# Patient Record
Sex: MALE | Race: WHITE | NOT HISPANIC OR LATINO | Employment: UNEMPLOYED | URBAN - METROPOLITAN AREA
[De-identification: names, ages, dates, MRNs, and addresses within clinical notes are randomized per-mention and may not be internally consistent; named-entity substitution may affect disease eponyms.]

---

## 2020-12-17 ENCOUNTER — APPOINTMENT (EMERGENCY)
Dept: RADIOLOGY | Facility: HOSPITAL | Age: 25
End: 2020-12-17

## 2020-12-17 ENCOUNTER — HOSPITAL ENCOUNTER (EMERGENCY)
Facility: HOSPITAL | Age: 25
Discharge: HOME | End: 2020-12-17
Attending: EMERGENCY MEDICINE | Admitting: EMERGENCY MEDICINE

## 2020-12-17 VITALS
TEMPERATURE: 98 F | OXYGEN SATURATION: 97 % | DIASTOLIC BLOOD PRESSURE: 71 MMHG | RESPIRATION RATE: 18 BRPM | SYSTOLIC BLOOD PRESSURE: 135 MMHG | HEART RATE: 83 BPM

## 2020-12-17 DIAGNOSIS — S90.31XA CONTUSION OF RIGHT FOOT, INITIAL ENCOUNTER: Primary | ICD-10-CM

## 2020-12-17 PROCEDURE — 99283 EMERGENCY DEPT VISIT LOW MDM: CPT

## 2020-12-17 PROCEDURE — 73630 X-RAY EXAM OF FOOT: CPT

## 2020-12-17 PROCEDURE — 99282 EMERGENCY DEPT VISIT SF MDM: CPT | Performed by: PHYSICIAN ASSISTANT

## 2021-01-01 NOTE — ED PROVIDER NOTES
History  Chief Complaint   Patient presents with    Foot Injury     Pt was helping car in snow and had right foot run over by tire  Pt has swelling noted to foot and tenderness  Pt has movement of toes  31year old male presents today with injury to his right foot  Pt reports helping to push a car out of the snow and in doing so, his right foot was run over by the car tire  Pt reports pain, swelling and difficulty bearing weight  Decreased ROM due to pain  Denies numbness or tingling  No medications taken prior to arrival            None       History reviewed  No pertinent past medical history  History reviewed  No pertinent surgical history  History reviewed  No pertinent family history  I have reviewed and agree with the history as documented  E-Cigarette/Vaping     E-Cigarette/Vaping Substances     Social History     Tobacco Use    Smoking status: Never Smoker    Smokeless tobacco: Never Used   Substance Use Topics    Alcohol use: Never     Frequency: Never    Drug use: Never       Review of Systems   Musculoskeletal: Positive for arthralgias and joint swelling  All other systems reviewed and are negative  Physical Exam  Physical Exam  Constitutional:       General: He is not in acute distress  Appearance: He is not toxic-appearing  HENT:      Head: Normocephalic and atraumatic  Cardiovascular:      Rate and Rhythm: Normal rate  Pulmonary:      Effort: No respiratory distress  Musculoskeletal:      Comments: Swelling to lateral aspect of right foot with pain ecchymosis  Decreased range of motion due to pain  Sensation intact, brisk capillary refill  Pulses intact  Skin:     General: Skin is warm and dry  Capillary Refill: Capillary refill takes less than 2 seconds  Neurological:      General: No focal deficit present  Mental Status: He is alert  Sensory: No sensory deficit     Psychiatric:         Mood and Affect: Mood normal          Behavior: Behavior normal          Vital Signs  ED Triage Vitals [12/17/20 1844]   Temperature Pulse Respirations Blood Pressure SpO2   98 °F (36 7 °C) (!) 106 18 168/81 98 %      Temp Source Heart Rate Source Patient Position - Orthostatic VS BP Location FiO2 (%)   Temporal Monitor Sitting Right arm --      Pain Score       6           Vitals:    12/17/20 1844 12/17/20 2000   BP: 168/81 135/71   Pulse: (!) 106 83   Patient Position - Orthostatic VS: Sitting Lying         Visual Acuity      ED Medications  Medications - No data to display    Diagnostic Studies  Results Reviewed     None                 XR foot 3+ views RIGHT   Final Result by Angélica Mosher MD (12/17 1931)      No acute osseous abnormality  Workstation performed: [id number]                    Procedures  Procedures         ED Course                             SBIRT 22yo+      Most Recent Value   SBIRT (24 yo +)   In order to provide better care to our patients, we are screening all of our patients for alcohol and drug use  Would it be okay to ask you these screening questions? Yes Filed at: 12/17/2020 2000   Initial Alcohol Screen: US AUDIT-C    1  How often do you have a drink containing alcohol? 2 Filed at: 12/17/2020 2000   2  How many drinks containing alcohol do you have on a typical day you are drinking? 2 Filed at: 12/17/2020 2000   3a  Male UNDER 65: How often do you have five or more drinks on one occasion? 0 Filed at: 12/17/2020 2000   3b  FEMALE Any Age, or MALE 65+: How often do you have 4 or more drinks on one occassion? 0 Filed at: 12/17/2020 2000   Audit-C Score  4 Filed at: 12/17/2020 2000   YANG: How many times in the past year have you    Used an illegal drug or used a prescription medication for non-medical reasons? Never Filed at: 12/17/2020 2000                    MDM  Number of Diagnoses or Management Options  Contusion of right foot, initial encounter:   Diagnosis management comments: Ace wrap applied, crutches provided  Advised patient weight-bearing as tolerated and recommended rest, ice elevation and NSAIDs p r n  Pain  He will follow-up with his PCP  Disposition  Final diagnoses:   Contusion of right foot, initial encounter     Time reflects when diagnosis was documented in both MDM as applicable and the Disposition within this note     Time User Action Codes Description Comment    12/17/2020  7:41 PM PATEL Borjas 115 Contusion of right foot, initial encounter       ED Disposition     ED Disposition Condition Date/Time Comment    Discharge Stable Thu Dec 17, 2020  7:41 PM Yaima Zambrano discharge to home/self care  Follow-up Information     Follow up With Specialties Details Why Contact Info Additional [address] Family Medicine Schedule an appointment as soon as possible for a visit   [address], [address] [facility] [address]  [address], [address], [address], [address] [place] [address] Avenue          There are no discharge medications for this patient  No discharge procedures on file      PDMP Review     None          ED Provider  Electronically Signed by           Benjamin Rodriguez PA-C  01/01/21 0008

## 2023-06-18 ENCOUNTER — HOSPITAL ENCOUNTER (EMERGENCY)
Facility: HOSPITAL | Age: 28
Discharge: HOME | End: 2023-06-19
Attending: EMERGENCY MEDICINE

## 2023-06-18 DIAGNOSIS — K63.89 EPIPLOIC APPENDAGITIS: Primary | ICD-10-CM

## 2023-06-18 LAB
ALBUMIN SERPL BCP-MCNC: 4.7 G/DL (ref 3.5–5)
ALP SERPL-CCNC: 31 U/L (ref 34–104)
ALT SERPL W P-5'-P-CCNC: 44 U/L (ref 7–52)
ANION GAP SERPL CALCULATED.3IONS-SCNC: 9 MMOL/L (ref 4–13)
AST SERPL W P-5'-P-CCNC: 30 U/L (ref 13–39)
BILIRUB SERPL-MCNC: 0.32 MG/DL (ref 0.2–1)
BILIRUB UR QL STRIP: NEGATIVE
BUN SERPL-MCNC: 18 MG/DL (ref 5–25)
CALCIUM SERPL-MCNC: 9.5 MG/DL (ref 8.4–10.2)
CHLORIDE SERPL-SCNC: 108 MMOL/L (ref 96–108)
CLARITY UR: CLEAR
CO2 SERPL-SCNC: 26 MMOL/L (ref 21–32)
COLOR UR: YELLOW
CREAT SERPL-MCNC: 1.17 MG/DL (ref 0.6–1.3)
GFR SERPL CREATININE-BSD FRML MDRD: 84 ML/MIN/1.73SQ M
GLUCOSE SERPL-MCNC: 87 MG/DL (ref 65–140)
GLUCOSE UR STRIP-MCNC: NEGATIVE MG/DL
HGB UR QL STRIP.AUTO: NEGATIVE
KETONES UR STRIP-MCNC: NEGATIVE MG/DL
LEUKOCYTE ESTERASE UR QL STRIP: NEGATIVE
LIPASE SERPL-CCNC: 16 U/L (ref 11–82)
NITRITE UR QL STRIP: NEGATIVE
PH UR STRIP.AUTO: 7 [PH] (ref 4.5–8)
POTASSIUM SERPL-SCNC: 4.1 MMOL/L (ref 3.5–5.3)
PROT SERPL-MCNC: 7.5 G/DL (ref 6.4–8.4)
PROT UR STRIP-MCNC: NEGATIVE MG/DL
SODIUM SERPL-SCNC: 143 MMOL/L (ref 135–147)
SP GR UR STRIP.AUTO: 1.02 (ref 1–1.03)
UROBILINOGEN UR QL STRIP.AUTO: 0.2 E.U./DL

## 2023-06-18 PROCEDURE — 85025 COMPLETE CBC W/AUTO DIFF WBC: CPT

## 2023-06-18 PROCEDURE — 83690 ASSAY OF LIPASE: CPT

## 2023-06-18 PROCEDURE — 36415 COLL VENOUS BLD VENIPUNCTURE: CPT

## 2023-06-18 PROCEDURE — 99284 EMERGENCY DEPT VISIT MOD MDM: CPT

## 2023-06-18 PROCEDURE — 81003 URINALYSIS AUTO W/O SCOPE: CPT

## 2023-06-18 PROCEDURE — 80053 COMPREHEN METABOLIC PANEL: CPT

## 2023-06-18 RX ORDER — ACETAMINOPHEN 325 MG/1
975 TABLET ORAL ONCE
Status: COMPLETED | OUTPATIENT
Start: 2023-06-18 | End: 2023-06-18

## 2023-06-18 RX ADMIN — ACETAMINOPHEN 975 MG: 325 TABLET ORAL at 23:27

## 2023-06-19 ENCOUNTER — APPOINTMENT (EMERGENCY)
Dept: CT IMAGING | Facility: HOSPITAL | Age: 28
End: 2023-06-19

## 2023-06-19 VITALS
OXYGEN SATURATION: 97 % | HEART RATE: 72 BPM | DIASTOLIC BLOOD PRESSURE: 69 MMHG | HEIGHT: 67 IN | BODY MASS INDEX: 34.74 KG/M2 | TEMPERATURE: 98.5 F | WEIGHT: 221.34 LBS | SYSTOLIC BLOOD PRESSURE: 126 MMHG | RESPIRATION RATE: 18 BRPM

## 2023-06-19 LAB
BASOPHILS # BLD AUTO: 0.03 THOUSANDS/ÂΜL (ref 0–0.1)
BASOPHILS NFR BLD AUTO: 0 % (ref 0–1)
EOSINOPHIL # BLD AUTO: 0.07 THOUSAND/ÂΜL (ref 0–0.61)
EOSINOPHIL NFR BLD AUTO: 1 % (ref 0–6)
ERYTHROCYTE [DISTWIDTH] IN BLOOD BY AUTOMATED COUNT: 17.6 % (ref 11.6–15.1)
HCT VFR BLD AUTO: 44.4 % (ref 36.5–49.3)
HGB BLD-MCNC: 14 G/DL (ref 12–17)
IMM GRANULOCYTES # BLD AUTO: 0.04 THOUSAND/UL (ref 0–0.2)
IMM GRANULOCYTES NFR BLD AUTO: 0 % (ref 0–2)
LYMPHOCYTES # BLD AUTO: 3.26 THOUSANDS/ÂΜL (ref 0.6–4.47)
LYMPHOCYTES NFR BLD AUTO: 32 % (ref 14–44)
MCH RBC QN AUTO: 20.7 PG (ref 26.8–34.3)
MCHC RBC AUTO-ENTMCNC: 31.5 G/DL (ref 31.4–37.4)
MCV RBC AUTO: 66 FL (ref 82–98)
MONOCYTES # BLD AUTO: 0.73 THOUSAND/ÂΜL (ref 0.17–1.22)
MONOCYTES NFR BLD AUTO: 7 % (ref 4–12)
NEUTROPHILS # BLD AUTO: 6.01 THOUSANDS/ÂΜL (ref 1.85–7.62)
NEUTS SEG NFR BLD AUTO: 60 % (ref 43–75)
NRBC BLD AUTO-RTO: 0 /100 WBCS
PLATELET # BLD AUTO: 292 THOUSANDS/UL (ref 149–390)
PMV BLD AUTO: 11 FL (ref 8.9–12.7)
RBC # BLD AUTO: 6.77 MILLION/UL (ref 3.88–5.62)
WBC # BLD AUTO: 10.14 THOUSAND/UL (ref 4.31–10.16)

## 2023-06-19 PROCEDURE — 74177 CT ABD & PELVIS W/CONTRAST: CPT

## 2023-06-19 PROCEDURE — 96374 THER/PROPH/DIAG INJ IV PUSH: CPT

## 2023-06-19 PROCEDURE — G1004 CDSM NDSC: HCPCS

## 2023-06-19 RX ORDER — KETOROLAC TROMETHAMINE 30 MG/ML
15 INJECTION, SOLUTION INTRAMUSCULAR; INTRAVENOUS ONCE
Status: COMPLETED | OUTPATIENT
Start: 2023-06-19 | End: 2023-06-19

## 2023-06-19 RX ADMIN — KETOROLAC TROMETHAMINE 15 MG: 30 INJECTION, SOLUTION INTRAMUSCULAR; INTRAVENOUS at 01:18

## 2023-06-19 RX ADMIN — IOHEXOL 100 ML: 350 INJECTION, SOLUTION INTRAVENOUS at 02:30

## 2023-06-19 NOTE — DISCHARGE INSTRUCTIONS
Epiploic appendages are normal outpouchings of fat in the abdomen  Appendagitis occurs when one of these small structures becomes inflamed  This can happen due to twisting of the structure    Inflammation of these structures are benign and improve with NSAIDs (such as Ibuprofen) and rest      Follow up with your PCP this week    Return for worsening pain, fever, chills, or any other new or concerning symptoms    Take ibuprofen 600 mg every 8 hours for 4 to 6 days

## 2024-07-20 ENCOUNTER — HOSPITAL ENCOUNTER (EMERGENCY)
Facility: HOSPITAL | Age: 29
Discharge: HOME | End: 2024-07-20
Attending: EMERGENCY MEDICINE
Payer: COMMERCIAL

## 2024-07-20 ENCOUNTER — APPOINTMENT (EMERGENCY)
Dept: RADIOLOGY | Facility: HOSPITAL | Age: 29
End: 2024-07-20
Payer: COMMERCIAL

## 2024-07-20 VITALS
TEMPERATURE: 98 F | DIASTOLIC BLOOD PRESSURE: 81 MMHG | HEART RATE: 95 BPM | BODY MASS INDEX: 34.25 KG/M2 | OXYGEN SATURATION: 99 % | SYSTOLIC BLOOD PRESSURE: 146 MMHG | WEIGHT: 218.7 LBS | RESPIRATION RATE: 18 BRPM

## 2024-07-20 DIAGNOSIS — S61.309A AVULSION OF FINGERNAIL, INITIAL ENCOUNTER: ICD-10-CM

## 2024-07-20 DIAGNOSIS — T14.8XXA CRUSH INJURY: ICD-10-CM

## 2024-07-20 DIAGNOSIS — S61.219A FINGER LACERATION: Primary | ICD-10-CM

## 2024-07-20 PROCEDURE — 73130 X-RAY EXAM OF HAND: CPT

## 2024-07-20 PROCEDURE — 90471 IMMUNIZATION ADMIN: CPT

## 2024-07-20 PROCEDURE — 12001 RPR S/N/AX/GEN/TRNK 2.5CM/<: CPT | Performed by: EMERGENCY MEDICINE

## 2024-07-20 PROCEDURE — 90715 TDAP VACCINE 7 YRS/> IM: CPT

## 2024-07-20 PROCEDURE — 99284 EMERGENCY DEPT VISIT MOD MDM: CPT | Performed by: EMERGENCY MEDICINE

## 2024-07-20 PROCEDURE — 99283 EMERGENCY DEPT VISIT LOW MDM: CPT

## 2024-07-20 RX ORDER — ACETAMINOPHEN 325 MG/1
325 TABLET ORAL ONCE
Status: COMPLETED | OUTPATIENT
Start: 2024-07-20 | End: 2024-07-20

## 2024-07-20 RX ORDER — LIDOCAINE HYDROCHLORIDE AND EPINEPHRINE 10; 10 MG/ML; UG/ML
1 INJECTION, SOLUTION INFILTRATION; PERINEURAL ONCE
Status: DISCONTINUED | OUTPATIENT
Start: 2024-07-20 | End: 2024-07-20

## 2024-07-20 RX ORDER — IBUPROFEN 400 MG/1
400 TABLET ORAL ONCE
Status: COMPLETED | OUTPATIENT
Start: 2024-07-20 | End: 2024-07-20

## 2024-07-20 RX ORDER — OXYCODONE HYDROCHLORIDE 5 MG/1
5 TABLET ORAL ONCE
Status: DISCONTINUED | OUTPATIENT
Start: 2024-07-20 | End: 2024-07-20

## 2024-07-20 RX ORDER — LIDOCAINE HYDROCHLORIDE AND EPINEPHRINE 10; 10 MG/ML; UG/ML
20 INJECTION, SOLUTION INFILTRATION; PERINEURAL ONCE
Status: COMPLETED | OUTPATIENT
Start: 2024-07-20 | End: 2024-07-20

## 2024-07-20 RX ORDER — ACETAMINOPHEN 325 MG/1
650 TABLET ORAL ONCE
Status: COMPLETED | OUTPATIENT
Start: 2024-07-20 | End: 2024-07-20

## 2024-07-20 RX ADMIN — ACETAMINOPHEN 650 MG: 325 TABLET, FILM COATED ORAL at 07:59

## 2024-07-20 RX ADMIN — ACETAMINOPHEN 325 MG: 325 TABLET, FILM COATED ORAL at 08:13

## 2024-07-20 RX ADMIN — TETANUS TOXOID, REDUCED DIPHTHERIA TOXOID AND ACELLULAR PERTUSSIS VACCINE, ADSORBED 0.5 ML: 5; 2.5; 8; 8; 2.5 SUSPENSION INTRAMUSCULAR at 07:59

## 2024-07-20 RX ADMIN — IBUPROFEN 400 MG: 400 TABLET, FILM COATED ORAL at 08:13

## 2024-07-20 RX ADMIN — LIDOCAINE HYDROCHLORIDE,EPINEPHRINE BITARTRATE 20 ML: 10; .01 INJECTION, SOLUTION INFILTRATION; PERINEURAL at 08:45

## 2024-07-20 NOTE — Clinical Note
Candy Kwong was seen and treated in our emergency department on 7/20/2024.                Diagnosis: Complete nail avulsion, hand injury    Candy  may return to work on return date.    He may return on this date: 07/22/2024         If you have any questions or concerns, please don't hesitate to call.      Shanice Huynh, DO    ______________________________           _______________          _______________  Hospital Representative                              Date                                Time

## 2024-07-20 NOTE — DISCHARGE INSTRUCTIONS
Follow up with hand surgery in the next 3-5 days  You have take tylenol motrin every 4-6 hours, together, for pain  If you have worsening symptoms, come back to the ED  If you notice signs of infection come back to the ED

## 2024-07-20 NOTE — ED PROVIDER NOTES
History  Chief Complaint   Patient presents with    Finger Injury     Pt states that right middle finger got caught when working on a car and his nail got ripped off. Unknown last tetanus.      HPI  MDM  Pt is a 28 Y O M, presenting for complete nail avulsion of the right middle finger after an accidental injury while repairing his sister car.Complains of pain, no other symptoms. Unknown tdap     Initial presentation pt is uncomfortable secondary to pain    On exam   General: VS reviewed  Appears in NAD  awake, alert.   Well-nourished, well-developed. Appears stated age.   Speaking normally in full sentences.   Head: Normocephalic, atraumatic  Eyes: EOM-I. No diplopia.   No hyphema.   No subconjunctival hemorrhages.  Symmetrical lids.   ENT: Atraumatic external nose and ears.    MMM  No malocclusion. No stridor. Normal phonation. No drooling. Normal swallowing.   Neck: No JVD.  CV: No pallor noted  Lungs:   No tachypnea  No respiratory distress  MSK:   Complete nail avulsion of right middle finger, small laceration on the distal finger tip on palmar side. Able to move finger, neurovascularly intact.   Skin: Dry, intact.   Neuro: Awake, alert, GCS15, CN II-XII grossly intact.   Motor grossly intact.  Psychiatric/Behavioral: Appropriate mood and affect   Exam: deferred        Ddx: traumatic injury, nail avulsion.     Plan: xray to rule out fracture. Updated Tdap. Pain control. Suture aluminium foil  in place to keep proximal nail fold open to help nail growth. Discharged with hand surgery followup.       Final Dispo:     Pt is hemodynamically stable and clear for discharge with outpatient f/u with their PCP. Return precautions given pt verbalized understanding      None       History reviewed. No pertinent past medical history.    History reviewed. No pertinent surgical history.    History reviewed. No pertinent family history.  I have reviewed and agree with the history as documented.    E-Cigarette/Vaping     E-Cigarette Use Never User      E-Cigarette/Vaping Substances     Social History     Tobacco Use    Smoking status: Never    Smokeless tobacco: Never   Vaping Use    Vaping status: Never Used   Substance Use Topics    Alcohol use: Never    Drug use: Never        Review of Systems    Physical Exam  ED Triage Vitals   Temperature Pulse Respirations Blood Pressure SpO2   07/20/24 0742 07/20/24 0742 07/20/24 0742 07/20/24 0742 07/20/24 0742   98 °F (36.7 °C) 95 18 146/81 99 %      Temp src Heart Rate Source Patient Position - Orthostatic VS BP Location FiO2 (%)   -- 07/20/24 0742 -- -- --    Monitor         Pain Score       07/20/24 0743       5             Orthostatic Vital Signs  Vitals:    07/20/24 0742   BP: 146/81   Pulse: 95       Physical Exam    ED Medications  Medications   acetaminophen (TYLENOL) tablet 650 mg (650 mg Oral Given 7/20/24 0759)   tetanus-diphtheria-acellular pertussis (BOOSTRIX) IM injection 0.5 mL (0.5 mL Intramuscular Given 7/20/24 0759)   acetaminophen (TYLENOL) tablet 325 mg (325 mg Oral Given 7/20/24 0813)   ibuprofen (MOTRIN) tablet 400 mg (400 mg Oral Given 7/20/24 0813)   lidocaine-epinephrine (XYLOCAINE/EPINEPHRINE) 1 %-1:100,000 injection 20 mL (20 mL Infiltration Given by Other 7/20/24 0845)       Diagnostic Studies  Results Reviewed       None                   XR hand 3+ views RIGHT   Final Result by Gary Macario MD (07/20 1022)      No acute osseous abnormality.         Computerized Assisted Algorithm (CAA) may have been used to analyze all applicable images.         Workstation performed: [id number]               Procedures  Procedures      ED Course                                       Medical Decision Making  Amount and/or Complexity of Data Reviewed  Radiology: ordered.    Risk  OTC drugs.  Prescription drug management.          Disposition  Final diagnoses:   Crush injury   Avulsion of fingernail, initial encounter   Finger laceration     Time reflects when diagnosis  was documented in both MDM as applicable and the Disposition within this note       Time User Action Codes Description Comment    7/20/2024  8:36 AM Shanice Huynh Add [T14.8XXA] Crush injury     7/20/2024  8:36 AM Shanice Huynh Add [S61.309A] Avulsion of fingernail, initial encounter     7/20/2024  8:36 AM Shanice Huynh Add [S61.219A] Finger laceration     7/20/2024  8:36 AM Shanice Huynh Modify [T14.8XXA] Crush injury     7/20/2024  8:36 AM Shanice Huynh Modify [S61.309A] Avulsion of fingernail, initial encounter     7/20/2024  8:36 AM Shanice Huynh Modify [S61.219A] Finger laceration           ED Disposition       ED Disposition   Discharge    Condition   Stable    Date/Time   Sat Jul 20, 2024  8:36 AM    Comment   Candy Kwong discharge to home/self care.                   Follow-up Information       Follow up With Specialties Details Why Contact Info Additional Information    [facility] In 1 week  [address] [facility] [address] [phone] [facility], [address] [place], [place], [address] [phone]    [facility] Emergency Medicine  If symptoms worsen [address] [place] [id number] [phone] [facility] Emergency Department, [address] [place], [place], [address]            There are no discharge medications for this patient.        PDMP Review       None             ED Provider  Attending physically available and evaluated Candy Kwong. I managed the patient along with the ED Attending.    Electronically Signed by           Shanice Huynh DO  07/25/24 5680

## 2024-07-22 ENCOUNTER — TELEPHONE (OUTPATIENT)
Age: 29
End: 2024-07-22

## 2024-07-22 NOTE — TELEPHONE ENCOUNTER
Hello,    Please advise if a forced appointment can be accommodated for the patient:    Call back #: [phone]     Insurance: Self-pay     Reason for appointment: Avulsion of fingernail, right     Requested doctor and/or location: [facility]       Thank you.

## 2024-07-24 VITALS
HEIGHT: 67 IN | BODY MASS INDEX: 34.21 KG/M2 | WEIGHT: 218 LBS | DIASTOLIC BLOOD PRESSURE: 71 MMHG | SYSTOLIC BLOOD PRESSURE: 121 MMHG

## 2024-07-24 DIAGNOSIS — M25.641 FINGER STIFFNESS, RIGHT: ICD-10-CM

## 2024-07-24 DIAGNOSIS — S61.309A AVULSION OF FINGERNAIL, INITIAL ENCOUNTER: Primary | ICD-10-CM

## 2024-07-24 PROCEDURE — 99203 OFFICE O/P NEW LOW 30 MIN: CPT | Performed by: ORTHOPAEDIC SURGERY

## 2024-07-24 NOTE — LETTER
July 24, 2024     Patient: Candy Kwong  YOB: 1995  Date of Visit: 7/24/2024      To Whom it May Concern:    Candy Kwong is under my professional care. Candy was seen in my office on 7/24/2024. Candy may return to work with limitations for the next two weeks .  Limitations include no lifting, pushing, pulling, grasping, or gripping with right hand.  Must keep right long finger bandage clean and dry.    If employer cannot accommodate restrictions, patient will be out of work for two weeks.    If you have any questions or concerns, please don't hesitate to call.         Sincerely,          Pati Wilkinson MD        CC: No Recipients

## 2024-07-24 NOTE — PROGRESS NOTES
Assessment/Plan:  1. Avulsion of fingernail, initial encounter  Ambulatory Referral to Orthopedic Surgery      2. Finger stiffness, right            Subjective history, physical examination performed, diagnostic imaging reviewed at today's visit  Diagnosis was discussed  Treatment options were discussed which included nonoperative treatment in the form of local wound care and home exercise program to address finger stiffness.    Risks, benefits, and realistic expectations for treatment options were discussed.    The patient was given an opportunity to ask questions.  Questions were answered to the patient's satisfaction.    Through shared decision making, the patient decided to move forward with local wound care and home exercise program that I gave to him in the office.  He will see hand therapy next week to check on range of motion and to remove the sutures in the aluminum stay which is maintaining patency to the proximal nail fold.  He will follow-up with me in 2 weeks for clinical evaluation.  Work note was given to the patient indicating that he may return to work with limitations/restrictions.  If his employer cannot accommodate, then he will be out of work for 2 weeks.          cc: fingernail avulsion    Subjective:   Candy Kwong is a right hand dominant 28 y.o. male who presents for evaluation and treatment of an injury that occurred to his right long finger on 7/20/2024.  He was working on a car when an avulsion to his fingernail occurred.  He went to a local emergency room where the wound was irrigated and aluminum was applied to maintain patency of the proximal nail fold.  This was sutured in place.  He has been doing local wound care.            History reviewed. No pertinent past medical history.    History reviewed. No pertinent surgical history.    History reviewed. No pertinent family history.    Social History     Occupational History    Not on file   Tobacco Use    Smoking status: Never     Smokeless tobacco: Never   Vaping Use    Vaping status: Never Used   Substance and Sexual Activity    Alcohol use: Never    Drug use: Never    Sexual activity: Not on file       No current outpatient medications on file.    Allergies   Allergen Reactions    Motrin [Ibuprofen]        Objective:  Vitals:    07/24/24 1050   BP: 121/71       The patient was awake, alert, and oriented to person, place, and time.  No acute distress.  Normocephalic.  EOMI.  Mucous membranes moist.  Normal respiratory effort.    Examination of the affected extremity was compared to the unaffected extremity.  Skin was intact.  No swelling or ecchymosis.  No deformity.  Hand and fingers were warm and well-perfused.  Capillary refill was brisk.  The nail plate to the long finger has been avulsed.  There is aluminum which is sutured in place with 2 sutures maintaining the patency of the proximal nail fold.  The patient was apprehensive to perform range of motion of the fingers however with encouragement he was able to perform composite flexion to 3 cm from the distal palmar flexion crease.  Sensation was grossly intact.      Imaging/Diagnostic Studies:    I reviewed imaging studies dated 7/20/2024 which included 3 views of the right hand.  These images studies demonstrated normal osseous anatomy        This document was created using speech voice recognition software.   Grammatical errors, random word insertions, pronoun errors, and incomplete sentences are an occasional consequence of this system due to software limitations, ambient noise, and hardware issues.   Any formal questions or concerns about content, text, or information contained within the body of this dictation should be directly addressed to the provider for clarification.

## 2024-07-26 ENCOUNTER — TELEPHONE (OUTPATIENT)
Age: 29
End: 2024-07-26

## 2024-07-26 NOTE — TELEPHONE ENCOUNTER
Caller: patient    Doctor: Jaja     Reason for call: Patient has stitches and stint in finger and he needs it removed & would like to know here he should go for that     Call back#: [phone]

## 2024-07-26 NOTE — TELEPHONE ENCOUNTER
Spoke to patient, was trying physical therapy close to his home, they do not remove stitches, gave him number for downstairs, he's going to call them. Didn't want to wait for his next appointment in 2 weeks

## 2024-07-31 ENCOUNTER — TELEPHONE (OUTPATIENT)
Dept: OBGYN CLINIC | Facility: CLINIC | Age: 29
End: 2024-07-31

## 2024-07-31 NOTE — TELEPHONE ENCOUNTER
Called patient because of message sent from PT that patient did not schedule a suture removal / hand therapy appt with them. Called patient and offered patient to come in on 08/06 but patient declined. Patient that stated that he removed the sutures himself either on the 07/27 or 07/28 because there was a tag on the sutures he said that was coming off and getting stuck on things. Patient then requested to move his appt with Dr. Wilkinson on 08/09 from the morning the afternoon, this caller rescheduled the appt. To the afternoon of 08/09. Patient expressed understanding and thankfulness.